# Patient Record
Sex: FEMALE | Race: OTHER | Employment: UNEMPLOYED | URBAN - METROPOLITAN AREA
[De-identification: names, ages, dates, MRNs, and addresses within clinical notes are randomized per-mention and may not be internally consistent; named-entity substitution may affect disease eponyms.]

---

## 2018-03-20 ENCOUNTER — HOSPITAL ENCOUNTER (EMERGENCY)
Age: 48
Discharge: HOME | End: 2018-03-20
Attending: EMERGENCY MEDICINE
Payer: SELF-PAY

## 2018-03-20 VITALS
RESPIRATION RATE: 16 BRPM | HEART RATE: 82 BPM | TEMPERATURE: 98 F | OXYGEN SATURATION: 97 % | SYSTOLIC BLOOD PRESSURE: 125 MMHG | DIASTOLIC BLOOD PRESSURE: 78 MMHG

## 2018-03-20 DIAGNOSIS — R09.81 NASAL CONGESTION: Primary | ICD-10-CM

## 2018-03-20 LAB — S PYO AG THROAT QL: NEGATIVE

## 2018-03-20 PROCEDURE — 87070 CULTURE OTHR SPECIMN AEROBIC: CPT | Performed by: PHYSICIAN ASSISTANT

## 2018-03-20 PROCEDURE — 74011250637 HC RX REV CODE- 250/637: Performed by: PHYSICIAN ASSISTANT

## 2018-03-20 PROCEDURE — 87880 STREP A ASSAY W/OPTIC: CPT

## 2018-03-20 PROCEDURE — 99283 EMERGENCY DEPT VISIT LOW MDM: CPT

## 2018-03-20 RX ORDER — IBUPROFEN 600 MG/1
600 TABLET ORAL
Qty: 20 TAB | Refills: 0 | Status: SHIPPED | OUTPATIENT
Start: 2018-03-20

## 2018-03-20 RX ORDER — GUAIFENESIN 600 MG/1
1200 TABLET, EXTENDED RELEASE ORAL EVERY 12 HOURS
Status: DISCONTINUED | OUTPATIENT
Start: 2018-03-20 | End: 2018-03-20 | Stop reason: HOSPADM

## 2018-03-20 RX ADMIN — GUAIFENESIN 1200 MG: 600 TABLET, EXTENDED RELEASE ORAL at 02:25

## 2018-03-20 NOTE — ED TRIAGE NOTES
Triage note: Pt arrives ambulatory with c/o nasal congestion x 1 day. +sore throat. Pt taking Afrin and Allegra with no relief.

## 2018-03-20 NOTE — ED PROVIDER NOTES
HPI Comments: 48 yo female with no significant PMH here for evaluation of congestion. States continued congestion over the past 1-2 weeks; worse today. Taking Afrin and Allegra without relief. Denies cough or facial pain/sinus pressure. Denies fever, CP, SOB, abd pain, flank pain, urinary symptoms. Patient is a 49 y.o. female presenting with nasal congestion. The history is provided by the patient and the spouse. The history is limited by a language barrier. A  was used. Nasal Congestion   This is a recurrent problem. The current episode started more than 1 week ago. The problem occurs constantly. Pertinent negatives include no chest pain, no abdominal pain, no headaches and no shortness of breath. Nothing aggravates the symptoms. Nothing relieves the symptoms. History reviewed. No pertinent past medical history. History reviewed. No pertinent surgical history. History reviewed. No pertinent family history. Social History     Social History    Marital status: N/A     Spouse name: N/A    Number of children: N/A    Years of education: N/A     Occupational History    Not on file. Social History Main Topics    Smoking status: Not on file    Smokeless tobacco: Not on file    Alcohol use Not on file    Drug use: Not on file    Sexual activity: Not on file     Other Topics Concern    Not on file     Social History Narrative    No narrative on file         ALLERGIES: Review of patient's allergies indicates no known allergies. Review of Systems   Constitutional: Negative for activity change and fever. HENT: Positive for congestion and sore throat. Negative for facial swelling, sinus pain, sinus pressure and voice change. Eyes: Negative for discharge. Respiratory: Negative for cough and shortness of breath. Cardiovascular: Negative for chest pain and leg swelling. Gastrointestinal: Negative for abdominal distention and abdominal pain.    Skin: Negative for color change. Neurological: Negative for seizures, syncope and headaches. Psychiatric/Behavioral: Negative for behavioral problems. Vitals:    03/20/18 0038   BP: 127/77   Pulse: 76   Resp: 14   Temp: 97.4 °F (36.3 °C)   SpO2: 98%            Physical Exam   Constitutional: She is oriented to person, place, and time. She appears well-developed and well-nourished. No distress. HENT:   Head: Normocephalic and atraumatic. Right Ear: External ear normal.   Left Ear: External ear normal.   Nose: Nose normal.   Mouth/Throat: Oropharynx is clear and moist. No oropharyngeal exudate. +Rhinorrhea; no sinus tenderness    Eyes: Conjunctivae and EOM are normal. Pupils are equal, round, and reactive to light. Right eye exhibits no discharge. Left eye exhibits no discharge. Neck: Normal range of motion. Neck supple. No meningeal signs    Cardiovascular: Normal rate, regular rhythm, normal heart sounds and intact distal pulses. Pulmonary/Chest: Effort normal and breath sounds normal.   Abdominal: Soft. Bowel sounds are normal. She exhibits no distension. There is no tenderness. There is no rebound and no guarding. Musculoskeletal: Normal range of motion. She exhibits no edema or tenderness. Lymphadenopathy:     She has no cervical adenopathy. Neurological: She is alert and oriented to person, place, and time. No cranial nerve deficit. Coordination normal.   Skin: Skin is warm and dry. No rash noted. Psychiatric: She has a normal mood and affect. Her behavior is normal. Judgment and thought content normal.   Nursing note and vitals reviewed. MDM  Number of Diagnoses or Management Options  Nasal congestion:      Amount and/or Complexity of Data Reviewed  Clinical lab tests: ordered and reviewed  Discuss the patient with other providers: yes          ED Course       Procedures    Patient has been reassessed. Feeling much better. Reviewed labs, medications with patient.   Ready to discharge home. Patient's results have been reviewed with them. Patient and/or family have verbally conveyed their understanding and agreement of the patient's signs, symptoms, diagnosis, treatment and prognosis and additionally agree to follow up as recommended or return to the Emergency Room should their condition change prior to follow-up. Discharge instructions have also been provided to the patient with some educational information regarding their diagnosis as well a list of reasons why they would want to return to the ER prior to their follow-up appointment should their condition change.   NGHIA Gil

## 2018-03-20 NOTE — DISCHARGE INSTRUCTIONS
Infección de las vías respiratorias altas ([place] ): Instrucciones de cuidado - [ Upper Respiratory Infection (Cold): Care Instructions ]  Instrucciones de cuidado    La infección de las vías respiratorias altas (o URI, por radha siglas en inglés), es santos infección de la [place], los senos paranasales o la garganta. Las URI se transmiten por la tos, los estornudos y el contacto directo. El resfriado común es el tipo más frecuente de URI. La gripe y las infecciones de los senos paranasales son otros tipos de URI. Carol todas las URI son causadas por virus. Los antibióticos no las Jean [place]. Sin embargo, usted puede tratar la mayoría de estas infecciones con cuidados en el hogar. [place] puede implicar beber muchos líquidos y ema analgésicos (medicamentos para el dolor) de venta everett. Es probable que se sienta mejor al cabo de 4 a 10 días. El médico lo coles revisado minuciosamente, prince se pueden presentar problemas más tarde. Si nota algún problema o nuevos síntomas, busque tratamiento médico inmediatamente. La atención de seguimiento es santos parte clave de noriega tratamiento y seguridad. Asegúrese de hacer y acudir a todas las citas, y llame a noriega médico si está teniendo problemas. También es santos buena idea saber los resultados de los exámenes y mantener santos lista de los medicamentos que carmen. ¿Cómo puede cuidarse en el [facility]? · Para prevenir la deshidratación, lucy abundantes líquidos, los suficientes tejal para que noriega orina sea de color amarillo cookie o transparente tejal el agua. Opte por beber agua y otros líquidos angela sin cafeína hasta que se sienta mejor. Si tiene [facility] [employer], del corazón o del hígado y tiene que Orondo's líquidos, hable con noriega médico antes de aumentar noriega consumo. · [place] un analgésico de venta everett, tejal acetaminofén (Tylenol), ibuprofeno (Advil, Motrin) o naproxeno (Aleve). Lillie y siga todas las instrucciones de la [place].   · Antes de usar medicamentos para la tos y los resfriados, revise la etiqueta. Estos medicamentos podrían no ser seguros para los niños pequeños o las personas con ciertos problemas de [place]. · Tenga cuidado cuando tome medicamentos de venta everett para el resfriado común o la gripe y Tylenol al [employer]. Muchos de estos medicamentos contienen acetaminofén, o sea, Tylenol. Lillie las etiquetas para asegurarse de que no está tomando santos dosis mayor que la recomendada. El exceso de acetaminofén (Tylenol) puede ser dañino. · Descanse lo suficiente. · No fume ni permita que otros fumen cerca de usted. Si necesita ayuda para dejar de fumar, hable con onriega médico acerca de programas y medicamentos para dejar de fumar. Estos pueden aumentar radha probabilidades de dejar el hábito para siempre. ¿Cuándo debe pedir ayuda? Llame al 911 en cualquier momento que considere que necesita atención de [place]. Por ejemplo, llame si:  ? · Tiene graves dificultades para respirar. ? Llame a noriega médico ahora mismo o busque atención médica inmediata si:  ? · Le parece que está mucho más enfermo. ? · Tiene nueva o peor dificultad para respirar. ? · Tiene fiebre nueva o más alessandro. ? · Tiene un salpullido nuevo. ?Preste especial atención a los cambios en noriega graham y asegúrese de comunicarse con noriega médico si:  ? · Tiene síntomas nuevos, tejal dolor de garganta, dolor de oídos o dolor de los senos paranasales. ? · Noriega tos es más profunda o más frecuente que antes, especialmente si nota más mucosidad o un cambio en el color de la mucosidad. ? · No mejora tejal se esperaba. ¿Dónde puede encontrar más información en inglés? Jannet Creed a http://manasa-eugenia.info/. Jose Manuel Wilburn N346 en la búsqueda para aprender más acerca de \"Infección de las vías respiratorias altas ([place]): Instrucciones de cuidado - [ Upper Respiratory Infection (Cold): Care Instructions ]. \"  Revisado: 12 [place], 2017  Versión del contenido: 11.4  © 9977-1571 Healthwise, Med[employer].  Robert Hwang instrucciones de cuidado fueron adaptadas bajo licencia por Good [facility] (which disclaims liability or warranty for this information). Si usted tiene [facility] afección médica o sobre estas instrucciones, siempre pregunte a noriega profesional de graham. [facility] niega toda garantía o responsabilidad por noriega uso de esta información.

## 2018-03-20 NOTE — ED NOTES
PA reviewed discharge instructions and options with patient and patient verbalized understanding. RN reviewed discharge instructions using teachback method. Pt ambulated to exit without difficulty and in no signs of acute distress escorted by  who will drive home. No complaints or needs expressed at this time. Patient was counseled on medications prescribed at discharge. VSS at time of discharge. Pt to call PCP in the morning for follow up.

## 2018-03-22 LAB
BACTERIA SPEC CULT: NORMAL
SERVICE CMNT-IMP: NORMAL

## 2018-04-20 ENCOUNTER — OFFICE VISIT (OUTPATIENT)
Dept: FAMILY MEDICINE CLINIC | Age: 48
End: 2018-04-20

## 2018-04-20 VITALS
HEIGHT: 63 IN | HEART RATE: 63 BPM | TEMPERATURE: 97.6 F | SYSTOLIC BLOOD PRESSURE: 120 MMHG | WEIGHT: 155 LBS | DIASTOLIC BLOOD PRESSURE: 72 MMHG | BODY MASS INDEX: 27.46 KG/M2

## 2018-04-20 DIAGNOSIS — J30.1 SEASONAL ALLERGIC RHINITIS DUE TO POLLEN: ICD-10-CM

## 2018-04-20 DIAGNOSIS — Z13.9 ENCOUNTER FOR SCREENING: Primary | ICD-10-CM

## 2018-04-20 LAB
S PYO AG THROAT QL: NEGATIVE
VALID INTERNAL CONTROL?: YES

## 2018-04-20 RX ORDER — FLUTICASONE PROPIONATE 50 MCG
SPRAY, SUSPENSION (ML) NASAL
Qty: 1 BOTTLE | Refills: 5 | Status: SHIPPED | OUTPATIENT
Start: 2018-04-20 | End: 2018-06-01 | Stop reason: SDUPTHER

## 2018-04-20 RX ORDER — PSEUDOEPHEDRINE HCL 30 MG
30 TABLET ORAL
Qty: 20 TAB | Refills: 2 | Status: SHIPPED | OUTPATIENT
Start: 2018-04-20 | End: 2018-06-01

## 2018-04-20 RX ORDER — LORATADINE 10 MG/1
10 TABLET ORAL DAILY
Qty: 30 TAB | Refills: 5 | Status: SHIPPED | OUTPATIENT
Start: 2018-04-20 | End: 2018-06-01

## 2018-04-20 RX ORDER — IBUPROFEN 600 MG/1
600 TABLET ORAL
Qty: 60 TAB | Refills: 1 | Status: SHIPPED | OUTPATIENT
Start: 2018-04-20 | End: 2018-06-01 | Stop reason: SDUPTHER

## 2018-04-20 NOTE — PATIENT INSTRUCTIONS
Alergias: Instrucciones de cuidado - [ Allergies: Care Instructions ]  Instrucciones de cuidado    Las alergias ocurren cuando el sistema de defensa del organismo (sistema inmunitario) reacciona de manera excesiva ante ciertas sustancias. El sistema inmunitario trata a santos sustancia inofensiva tejal si fuera un microbio perjudicial o un virus. Existen muchas sustancias que pueden provocar esta reacción excesiva, incluidos el polen, los medicamentos, los alimentos, el polvo, la caspa de los [place] y el moho. Las [employer] pueden ser leves o graves. Las alergias leves pueden manejarse en el hogar. Sin embargo, es posible que necesite ema medicamentos para prevenir problemas. Manejar las alergias es santos parte importante del estar saludable. Noriega médico podría sugerirle que se dariusz santos prueba de [place] para ayudar a encontrar la causa de las [employer]. Oralee Mohs que sepa cuáles son las cosas que [place] síntomas, puede evitarlas. [place] puede prevenir los síntomas de [place] y [de-identified] de [facility]. Para las [employer] graves que provocan reacciones que afectan a todo noriega organismo (reacciones anafilácticas), noriega médico podría recetarle santos inyección de epinefrina para que la lleve con usted en breanne de [place] reacción grave. Aprenda a aplicarse la dosis usted mismo y llévela consigo todo el [place]. Asegúrese de que no haya caducado. La atención de seguimiento es santos parte clave de noriega tratamiento y seguridad. Asegúrese de hacer y acudir a todas las citas, y llame a noriega médico si está teniendo problemas. También es santos buena idea saber los resultados de los exámenes y mantener santos lista de los medicamentos que carmen. ¿Cómo puede cuidarse en el hogar? · Si noriega médico le ha [employer] ácaros del polvo o el polvo es lo que causa noriega alergia, reduzca la cantidad de polvo alrededor de noriega cama:  ¨ Lave las sábanas, las fundas de las almohadas y demás ropa de cama con [place] todas las semanas.   Shani Woodwardant fundas para almohadas, edredones y colchones a prueba de polvo. Evite las fundas de plástico, ya que tienden a romperse fácilmente y no \"respiran\". Lave la ropa de cama siguiendo las instrucciones de la etiqueta. ¨ No use mantas ni almohadas que no necesite. ¨ Use mantas que pueda chelsi en la lavadora. [address] ashley y las alfombras de noriega habitación, ya que atraen y acumulan polvo. · Si usted es alérgico al polvo del hogar y a los [place], no use humidificadores. El médico puede sugerirle maneras de controlar el polvo y [place]. · Busque rastros de cucarachas. Las cucarachas provocan reacciones alérgicas. Use cebos para cucarachas para eliminarlas. Luego, limpie can noriega casa. A las cucarachas les [employer] lugares donde se almacenan bolsas del ortez, periódicos, botellas vacías o becky de cartón. No guarde estos objetos dentro de la casa, y mantenga el contenedor de basura y los recipientes de alimentos can cerrados. Selle todos los lugares por donde las cucarachas puedan ingresar a noriega hogar. · Si usted es alérgico al moho, deshágase de muebles, tapetes y ashley que huelan a moho. Revise que no haya moho en el baño. · Si usted es alérgico al polen del exterior o a las esporas de moho, use el aire acondicionado. Cambie o limpie todos los filtros santos vez al [facility]. [facility]. · Si usted es alérgico al polen, no salga cuando la concentración de polen sea alessandro. Use santos aspiradora con filtro HEPA o filtro de doble espesor al [employer] a la [place]. · No salga cuando la contaminación del aire sea alessandro. Evite los vapores de la pintura, los perfumes y otros olores moises. · Evite todo lo que pueda empeorar radha alergias. Manténgase alejado del humo. No fume ni deje que otras personas lo chetna en noriega casa. No use chimeneas ni estufas de leña. · Si usted es alérgico a radha mascotas, cambie el filtro de aire de la calefacción todos los [place].  Use filtros de alto rendimiento. · Si usted es alérgico a la caspa de los [place], no deje que las mascotas entren a la casa o manténgalas fuera de noriega habitación. Las alfombras [place] y los muebles tapizados con smita pueden albergar gran cantidad de caspa de animales. Deo vez tenga que reemplazarlos. ¿Cuándo debe pedir ayuda? Aplíquese santos inyección de epinefrina si:  ? · Piensa que está teniendo [facility] reacción alérgica grave. ? · Tiene síntomas en más de santos marianela del cuerpo, tejal náuseas leves y comezón en la boca. ? Después de aplicarse santos inyección de epinefrina, llame al 911 incluso si se siente mejor. ?Llame al 911 si:  ? · Tiene síntomas de santos reacción alérgica grave. Estos pueden incluir:  ¨ Zonas abultadas y enrojecidas (ronchas) que aparecen repentinamente por todo el cuerpo. ¨ Hinchazón de la garganta, la boca, los labios o la [place]. ¨ Dificultad para respirar. ¨ Pérdida del conocimiento (desmayo). O podría sentirse muy aturdido o de repente sentirse débil, confuso o agitado. ? · Le montgomery aplicado santos inyección de epinefrina, incluso si se siente mejor. ?Llame a noriega médico ahora mismo o busque atención médica inmediata si:  ? · Tiene síntomas de santos reacción alérgica, tales tejal:  ¨ Salpullido o ronchas (zonas abultadas y enrojecidas en la piel). ¨ Comezón. ¨ Hinchazón. ¨ Dolor abdominal, náuseas o vómito. ?Preste especial atención a los cambios en noriega graham y asegúrese de comunicarse con noriega médico si:  ? · No mejora tejal se esperaba. ¿Dónde puede encontrar más información en inglés? Vanessa End a http://manasa-eugenia.info/. [address] [place] [address] en la búsqueda para aprender más acerca de \"Alergias: Instrucciones de cuidado - [ Allergies: Care Instructions ]. \"  Revisado: 29 septiembre, 2016  Versión del contenido: 11.4  © 7465-9435 Healthwise, Wikets. Las instrucciones de cuidado fueron adaptadas bajo licencia por Good [employer] (which disclaims liability or warranty for this information). Si usted tiene [place] afección médica o sobre estas instrucciones, siempre pregunte a noriega profesional de graham. [facility] niega toda garantía o responsabilidad por noriega uso de esta información.

## 2018-04-20 NOTE — PROGRESS NOTES
Printed AVS, provided to pt and reviewed. Pt indicated understanding and had no questions. Told pt that rx's have been sent to pharmacy and they should be ready for  in approximately 2 hrs. Pt told to please present GoodRx. com coupon which we provided to your pharmacy to receive discounted price. The medication's ordered today were reviewed with the pt. Yanna Eldridge was the .  Jaylin Elizabeth RN

## 2018-04-20 NOTE — PROGRESS NOTES
Results for orders placed or performed in visit on 04/20/18   AMB POC RAPID STREP A   Result Value Ref Range    VALID INTERNAL CONTROL POC Yes     Group A Strep Ag Negative Negative

## 2018-04-20 NOTE — PROGRESS NOTES
Assessment/Plan:    Diagnoses and all orders for this visit:    1. Encounter for screening  -     AMB POC RAPID STREP A    2. Seasonal allergic rhinitis due to pollen  -     fluticasone (FLONASE ALLERGY RELIEF) 50 mcg/actuation nasal spray; Si spray each nostril bid. uso 1 gota cada nares dos veces al jannie  -     pseudoephedrine (SUDAFED) 30 mg tablet; Take 1 Tab by mouth every four (4) hours as needed for Congestion. Erick 1 pastilla cada 4 horas si necissita  -     loratadine (CLARITIN) 10 mg tablet; Take 1 Tab by mouth daily. Erick 1 pastilla cada jannie  -     ibuprofen (MOTRIN) 600 mg tablet; Take 1 Tab by mouth every six (6) hours as needed for Pain. Erick 1 pastilla cada 6 horas si necissita        Follow-up Disposition:  Return if symptoms worsen or fail to improve. ANAM Virgen expressed understanding of this plan. An AVS was printed and given to the patient.      ----------------------------------------------------------------------    Chief Complaint   Patient presents with    Nasal Congestion     x5 months. sore throat. History of Present Illness:  Pt is here for 24 + hours of nasal congestion  She went to the ER a few days ago for similar sxs and was given mucinex which has not helped her sxs  She denies the following: itchy eyes, cough, sore throat or fever  She denies risk of pregnancy        No past medical history on file. Current Outpatient Prescriptions   Medication Sig Dispense Refill    fluticasone (FLONASE ALLERGY RELIEF) 50 mcg/actuation nasal spray Si spray each nostril bid. uso 1 gota cada nares dos veces al jannie 1 Bottle 5    pseudoephedrine (SUDAFED) 30 mg tablet Take 1 Tab by mouth every four (4) hours as needed for Congestion. Erick 1 pastilla cada 4 horas si necissita 20 Tab 2    loratadine (CLARITIN) 10 mg tablet Take 1 Tab by mouth daily.  Erick 1 pastilla cada jannie 30 Tab 5    ibuprofen (MOTRIN) 600 mg tablet Take 1 Tab by mouth every six (6) hours as needed for Pain. Higgins 1 pastilla cada 6 horas si necissita 60 Tab 1       Allergies no known allergies    Social History   Substance Use Topics    Smoking status: Never Smoker    Smokeless tobacco: Never Used    Alcohol use No       No family history on file.     Physical Exam:     Visit Vitals    /72 (BP 1 Location: Right arm)    Pulse 63    Temp 97.6 °F (36.4 °C) (Oral)    Ht 5' 3.39\" (1.61 m)    Wt 155 lb (70.3 kg)    LMP 03/25/2018    BMI 27.12 kg/m2     gen looks well, pleasant  A&Ox3  WDWN NAD  Respirations normal and non labored  TM's bulging clear fluid  Nares boggy turbinates raul with clear mucoid discharge  OP red posteriorly  Neck supple  Lungs CTA raul

## 2018-04-20 NOTE — MR AVS SNAPSHOT
[address] [place] [address] [address] [place] [address] 
[phone] Patient: Niya Kraft MRN: [id number] Visit Information Verónica y [place] Personal Médico Departamento Teléfono del Dep. Número de visita 2018  8:30 AM [facility] [phone] Follow-up Instructions Return if symptoms worsen or fail to improve. Upcoming Health Maintenance Date Due DTaP/Tdap/Td series (1 - Tdap) 1990 PAP AKA CERVICAL CYTOLOGY 1990 Influenza Age 5 to Adult 2017 Jessica Lopez A partir del:  2018 No Known Allergies Vacunas actuales Agarwal Pel No hay ninguna vacuna archivada. No revisadas esta visita You Were Diagnosed With   
  
 Joy Henao Encounter for screening    -  Primary ICD-10-CM: Z13.9 ICD-9-CM: V82.9 Seasonal allergic rhinitis due to pollen     ICD-10-CM: J30.1 ICD-9-CM: 477.0 Partes vitales PS Pulso Temperatura [place] ( percentil de crecimiento) Peso (percentil de crecimiento) LMP (última tyler) 120/72 (BP 1 Location: Right arm) 63 97.6 °F (36.4 °C) (Oral) 5' 3.39\" (1.61 m) 155 lb (70.3 kg) 2018 BMI [facility]) Estado obstétrico Estatus de tabaquísmo 27.12 kg/m2 Having regular periods Never Smoker Historial de signos vitales BMI and BSA Data Body Mass Index Body Surface Area  
 27.12 kg/m 2 1.77 m 2 [facility] Name Phone Laura Bloch [address] [facility], [address] [place] [address] Rd. [phone] Mares lista de medicamentos actualizada Mercy Borjas actualizada 18  9:32 AM.  Marianne Rogers use mares lista de medicamentos más reciente. fluticasone 50 mcg/actuation nasal spray También conocido tejal:  FLONASE ALLERGY RELIEF  
 Si spray each nostril bid. uso 1 gota cada nares dos veces al jannie  
  
 ibuprofen 600 mg tablet También conocido tejal:  MOTRIN Take 1 Tab by mouth every six (6) hours as needed for Pain. [place] 1 pastilla cada 6 horas si necissita  
  
 loratadine 10 mg tablet También conocido tejal:  Ronold Mat[place]amkeag Take 1 Tab by mouth daily. [place] 1 pastilla cada jannie  
  
 pseudoephedrine 30 mg tablet También conocido tejal:  SUDAFED Take 1 Tab by mouth every four (4) hours as needed for Congestion. [place] 1 pastilla cada 4 horas si necissita Recetas Enviado a la [place] Refills  
 fluticasone (FLONASE ALLERGY RELIEF) 50 mcg/actuation nasal spray 5 Sig: Si spray each nostril bid. uso 1 gota cada nares dos veces al jannie Class: Normal  
 Pharmacy: [facility] DR ISABEL SCHULER [address] [place] [address],1 Ph #: [phone]  
 pseudoephedrine (SUDAFED) 30 mg tablet 2 Sig: Take 1 Tab by mouth every four (4) hours as needed for Congestion. [place] 1 pastilla cada 4 horas si necissita Class: Normal  
 Pharmacy: [facility] DR ISABEL SCHULER [address] Ph #: [phone] Route: Oral  
 loratadine (CLARITIN) 10 mg tablet 5 Sig: Take 1 Tab by mouth daily. [place] 1 pastilla cada jannie Class: Normal  
 Pharmacy: [facility] DR ISABEL SCHULER [address] Ph #: [phone] Route: Oral  
 ibuprofen (MOTRIN) 600 mg tablet 1 Sig: Take 1 Tab by mouth every six (6) hours as needed for Pain. [place] 1 pastilla cada 6 horas si necissita Class: Normal  
 Pharmacy: [facility] DR ISABEL FELICIANONADIA [address] Ph #: [phone] Route: Oral  
  
Hicimos lo siguiente AMB POC RAPID STREP A [27655 CPT(R)] Instrucciones de seguimiento Return if symptoms worsen or fail to improve. Instrucciones para el Paciente Alergias: Instrucciones de cuidado - [ Allergies: Care Instructions ] Instrucciones de cuidado Las alergias ocurren cuando el sistema de defensa del organismo (sistema inmunitario) reacciona de manera excesiva ante ciertas sustancias. El sistema inmunitario trata a santos sustancia inofensiva tejal si fuera un microbio perjudicial o un virus. Existen muchas sustancias que pueden provocar esta reacción excesiva, incluidos el polen, los medicamentos, los alimentos, el polvo, la caspa de los [place] y el moho. Las [address] pueden ser leves o graves. Las alergias leves pueden manejarse en el hogar. Sin embargo, es posible que necesite ema medicamentos para prevenir problemas. Manejar las alergias es santos parte importante del estar saludable. Noriega médico podría sugerirle que se dariusz santos prueba de [place] para ayudar a encontrar la causa de las [address]. [place] que sepa cuáles son las cosas que [place] síntomas, puede evitarlas. [place] puede prevenir los síntomas de [place] y [de-identified] de [facility]. Para las [address] graves que provocan reacciones que afectan a todo noriega organismo (reacciones anafilácticas), noriega médico podría recetarle santos inyección de epinefrina para que la lleve con usted en breanne Leonardo Bue reacción grave. Aprenda a aplicarse la dosis usted mismo y llévela consigo todo el Cel[place]. Asegúrese de que no haya caducado. La atención de seguimiento es santos parte clave de noriega tratamiento y seguridad. Asegúrese de hacer y acudir a todas las citas, y llame a noriega médico si está teniendo problemas. También es santos buena idea saber los resultados de los exámenes y mantener santos lista de los medicamentos que carmen. Cómo puede cuidarse en el hogar? · Si noriega médico le ha [employer] ácaros del polvo o el polvo es lo que causa noriega alergia, reduzca la cantidad de polvo alrededor de noriega cama: 
¨ Lave las sábanas, las fundas de las almohadas y demás ropa de cama con [place] todas las semanas. ¨ Utilice fundas para almohadas, edredones y colchones a prueba de polvo. Evite las fundas de plástico, ya que tienden a romperse fácilmente y no \"respiran\". Lave la ropa de cama siguiendo las instrucciones de la etiqueta. ¨ No use mantas ni almohadas que no necesite. ¨ Use mantas que pueda chelsi en la lavadora. [address] ashley y las alfombras de noriega habitación, ya que atraen y acumulan polvo. · Si usted es alérgico al polvo del hogar y a los [place], no use humidificadores. El médico puede sugerirle maneras de controlar el polvo y [place]. · Busque rastros de cucarachas. Las cucarachas provocan reacciones alérgicas. Use cebos para cucarachas para eliminarlas. Luego, limpie can noriega casa. A las cucarachas les [employer] lugares donde se almacenan bolsas del ortez, periódicos, botellas vacías o becky de cartón. No guarde estos objetos dentro de la casa, y mantenga el contenedor de basura y los recipientes de alimentos can cerrados. Selle todos los lugares por donde las cucarachas puedan ingresar a noriega hogar. · Si usted es alérgico al moho, deshágase de muebles, tapetes y ashley que huelan a moho. Revise que no haya moho en el baño. · Si usted es alérgico al polen del exterior o a las esporas de moho, use el aire acondicionado. Cambie o limpie todos los filtros santos vez al [facility]. [facility]. · Si usted es alérgico al polen, no salga cuando la concentración de polen sea alessandro. Use santos aspiradora con filtro HEPA o filtro de doble espesor al [employer] a la [place]. · No salga cuando la contaminación del aire sea alessandro. Evite los vapores de la pintura, los perfumes y otros olores moises. · Evite todo lo que pueda empeorar radha alergias. Manténgase alejado del humo. No fume ni deje que otras personas lo chetna en noriega casa. No use chimeneas ni estufas de leña. · Si usted es alérgico a radha mascotas, cambie el filtro de aire de la calefacción todos los [place]. Use filtros de alto rendimiento. · Si usted es alérgico a la caspa de los [place], no deje que las mascotas entren a la casa o manténgalas fuera de noriega habitación. Las alfombras [place] y los muebles tapizados con smita pueden albergar gran cantidad de caspa de animales. Deo vez tenga que reemplazarlos. Cuándo debe pedir ayuda? Aplíquese santos inyección de epinefrina si: 
? · Piensa que está teniendo [facility] reacción alérgica grave. ? · Tiene síntomas en más de santos marianela del cuerpo, tejal náuseas leves y comezón en la boca. ? Después de aplicarse santos inyección de epinefrina, llame al 911 incluso si se siente mejor. ?Llame al 911 si: 
? · Tiene síntomas de santos reacción alérgica grave. Estos pueden incluir: 
¨ Zonas abultadas y enrojecidas (ronchas) que aparecen repentinamente por todo el cuerpo. ¨ Hinchazón de la garganta, la boca, los labios o la [place]. ¨ Dificultad para respirar. ¨ Pérdida del conocimiento (desmayo). O podría sentirse muy aturdido o de repente sentirse débil, confuso o agitado. ? · Le montgomery aplicado santos inyección de epinefrina, incluso si se siente mejor. ?Llame a noriega médico ahora mismo o busque atención médica inmediata si: 
? · Tiene síntomas de santos reacción alérgica, tales tejal: 
¨ Salpullido o ronchas (zonas abultadas y enrojecidas en la piel). ¨ Comezón. ¨ Hinchazón. ¨ Dolor abdominal, náuseas o vómito. ?Preste especial atención a los cambios en noriega graham y asegúrese de comunicarse con noriega médico si: 
? · No mejora tejal se esperaba. Dónde puede encontrar más información en inglés? Sheryl Blevins a [email or web address] Patricia MACIAS en la búsqueda para aprender más acerca de \"Alergias: Instrucciones de cuidado - [ Allergies: Care Instructions ]. \" 
Revisado: 29 septiembre, 2016 Versión del contenido: 11.4 © 4413-6243 Healthwise, [employer] Coding.  Las instrucciones de cuidado fueron adaptadas bajo licencia por [employer] (which disclaims liability or warranty for this information). Si usted tiene [place] afección médica o sobre estas instrucciones, siempre pregunte a noriega profesional de graham. [facility] niega toda garantía o responsabilidad por noriega uso de esta información. Introducing [facility] SERVICES! [facility] introduce portal paciente MyChart . Ahora se puede acceder a partes de noriega expediente médico, enviar por correo electrónico la oficina de noriega médico y solicitar renovaciones de medicamentos en línea. En noriega navegador de Internet , Roshni Crejoyce a [email or web address]. My Perfect Gig/mychart Dariusz clic en el usuario por Frannie Lone? [place]ie[place] diop aquí en la sesión Raul Handy. Verá la página de registro [place]. Ingrese noriega código de [facility] evaristo y tejal aparece a continuación. Usted no tendrá que [employer] código después de marie completado el proceso de registro . Si usted no se inscribe antes de la fecha de caducidad , debe solicitar un nuevo código. · MyChart Rupa Fess : [id number] Expires: 7/19/2018  9:32 AM 
 
Ingresa los últimos cuatro dígitos de noriega Número de Seguro Social ( xxxx ) y fecha de nacimiento ( dd / mm / aaaa ) tejal se indica y dariusz clic en Enviar. [place] será llevado a la siguiente página de registro . Crear un ID MyChart . Esta será noriega ID de inicio de sesión de MyChart y no puede ser [place] , por lo que pensar en santos que es [place] y fácil de recordar . Crear santos contraseña MyChart . [place] puede cambiar noriega contraseña en cualquier momento . Ingrese noriega Password Reset de preguntas y [place] . [place] se puede utilizar en un momento posterior si usted olvida noriega contraseña. Introduzca noriega dirección de correo electrónico . Marielos Jerry recibirá santos notificación por correo electrónico cuando la nueva información está disponible en MyChart . Jose Eduardo diop en Registrarse.  Byron Levine megan y descargar porciones de noriega expediente médico. 
 Crow chikis en el enlace de descarga del menú Resumen para descargar santos copia portátil de noriega información médica . Si tiene [employer] , por favor visite la sección de preguntas frecuentes del sitio web MyChart . Recuerde, MyChart NO es que se utilizará para las necesidades urgentes. Para emergencias médicas , llame al 911 . Ahora disponible en noriega iPhone y Android ! Por favor proporcione shital resumen de la documentación de cuidado a noriega próximo proveedor. If you have any questions after today's visit, please call [phone].

## 2018-06-01 ENCOUNTER — OFFICE VISIT (OUTPATIENT)
Dept: FAMILY MEDICINE CLINIC | Age: 48
End: 2018-06-01

## 2018-06-01 VITALS
SYSTOLIC BLOOD PRESSURE: 121 MMHG | WEIGHT: 157 LBS | HEIGHT: 64 IN | BODY MASS INDEX: 26.8 KG/M2 | HEART RATE: 53 BPM | DIASTOLIC BLOOD PRESSURE: 72 MMHG | TEMPERATURE: 97.9 F | OXYGEN SATURATION: 100 %

## 2018-06-01 DIAGNOSIS — J30.1 SEASONAL ALLERGIC RHINITIS DUE TO POLLEN: ICD-10-CM

## 2018-06-01 DIAGNOSIS — M54.31 SCIATICA OF RIGHT SIDE: Primary | ICD-10-CM

## 2018-06-01 RX ORDER — PREDNISONE 10 MG/1
TABLET ORAL
Qty: 21 TAB | Refills: 0 | Status: SHIPPED | OUTPATIENT
Start: 2018-06-01 | End: 2018-10-19

## 2018-06-01 RX ORDER — IBUPROFEN 600 MG/1
600 TABLET ORAL
Qty: 60 TAB | Refills: 1 | Status: SHIPPED | OUTPATIENT
Start: 2018-06-01

## 2018-06-01 RX ORDER — FLUTICASONE PROPIONATE 50 MCG
SPRAY, SUSPENSION (ML) NASAL
Qty: 1 BOTTLE | Refills: 5 | Status: SHIPPED | OUTPATIENT
Start: 2018-06-01

## 2018-06-01 RX ORDER — CETIRIZINE HYDROCHLORIDE, PSEUDOEPHEDRINE HYDROCHLORIDE 5; 120 MG/1; MG/1
1 TABLET, FILM COATED, EXTENDED RELEASE ORAL 2 TIMES DAILY
Qty: 60 TAB | Refills: 2 | Status: SHIPPED | OUTPATIENT
Start: 2018-06-01

## 2018-06-01 NOTE — PROGRESS NOTES
Statements below were documented by Aissatou Fitch discharged home with AVS and Medication teaching . No further questions. Reviewed patient's medications, how to take, where to pickup and if any refills, patient verbalized understanding and has no questions. Good rx coupon given for Flonase $ 25.01 @ [employer] , no coupon necessary for Zyrtec $ 9.73. [facility] brochure given and explained in [address] [facility]  Jose Pederson RN

## 2018-06-01 NOTE — PROGRESS NOTES
Coordination of Care  1. Have you been to the ER, urgent care clinic since your last visit? Hospitalized since your last visit? No    2. Have you seen or consulted any other health care providers outside of the [address] [place] [address] since your last visit? Include any pap smears or colon screening. No    Does the patient need refills? YES    Learning Assessment Complete?  yes

## 2018-06-01 NOTE — MR AVS SNAPSHOT
Gabrielle Heck 
 
 
 [address] [place] [address] [address] [place] [address] 
[phone] Patient: Shellie Hough MRN: [id number] :1970 Visit Information Evan Calderon y [place] Personal Médico Departamento Teléfono del Dep. Número de visita 2018  8:30 AM Ayo Nava 89, PA [facility] [phone] [id number] Follow-up Instructions Return in about 6 weeks (around 2018), or if symptoms worsen or fail to improve. Upcoming Health Maintenance Date Due DTaP/Tdap/Td series (1 - Tdap) 1990 PAP AKA CERVICAL CYTOLOGY 1990 Influenza Age 5 to Adult 2018 Alergias  Review Complete El: 2018 Por: Jam Montiel A partir del:  2018 No Known Allergies Vacunas actuales Bulmaro Kelley No hay ninguna vacuna archivada. No revisadas esta visita You Were Diagnosed With   
  
 Deann Orn Sciatica of right side    -  Primary ICD-10-CM: M54.31 
ICD-9-CM: 724.3 Seasonal allergic rhinitis due to pollen     ICD-10-CM: J30.1 ICD-9-CM: 477.0 Partes vitales PS Pulso Temperatura [place] ( percentil de crecimiento) Peso (percentil de crecimiento) LMP (última tyler) 121/72 (BP 1 Location: Right arm, BP Patient Position: Sitting) (!) 53 97.9 °F (36.6 °C) (Oral) 5' 3.75\" (1.619 m) 157 lb (71.2 kg) 2018 SpO2 BMI (IMC) Estado obstétrico Estatus de tabaquísmo 100% 27.16 kg/m2 Having regular periods Never Smoker Historial de signos vitales BMI and BSA Data Body Mass Index Body Surface Area  
 27.16 kg/m 2 1.79 m 2 [facility] Name Phone [address] [place] [address], [address] [place] [address] Rd. [phone] Mares lista de medicamentos actualizada Caralyn Larch actualizada 18  9:32 AM.  Stephanie Ellington use mares lista de medicamentos más reciente. cetirizine-psuedoePHEDrine 5-120 mg per tablet También conocido tejal:  ZyrTEC-D Take 1 Tab by mouth two (2) times a day. fluticasone 50 mcg/actuation nasal spray También conocido tejal:  FLONASE ALLERGY RELIEF Si spray each nostril bid. uso 1 gota cada nares dos veces al jannie  
  
 ibuprofen 600 mg tablet También conocido tejal:  MOTRIN Take 1 Tab by mouth every six (6) hours as needed for Pain. [place] 1 pastilla cada 6 horas si necissita  
  
 predniSONE 10 mg tablet También conocido tejal:  Gina Look Si today, then 1 less pill daily until all pills are gone. English sig please Recetas Enviado a la [place] Refills  
 cetirizine-psuedoePHEDrine (ZYRTEC-D) 5-120 mg per tablet 2 Sig: Take 1 Tab by mouth two (2) times a day. Class: Normal  
 Pharmacy: [address] [place] [address] Ph #: [phone] Route: Oral  
 predniSONE (DELTASONE) 10 mg tablet 0 Sig: Si today, then 1 less pill daily until all pills are gone. English sig please Class: Normal  
 Pharmacy: [address] [place] [address],[facility] Ph #: [phone]  
 ibuprofen (MOTRIN) 600 mg tablet 1 Sig: Take 1 Tab by mouth every six (6) hours as needed for Pain. [place] 1 pastilla cada 6 horas si necissita Class: Normal  
 Pharmacy: [address] [place] [address] Ph #: [phone] Route: Oral  
 fluticasone (FLONASE ALLERGY RELIEF) 50 mcg/actuation nasal spray 5 Sig: Si spray each nostril bid. uso 1 gota cada nares dos veces al jannie Class: Normal  
 Pharmacy: [address] [place] [address] Ph #: [phone] Instrucciones de seguimiento Return in about 6 weeks (around 2018), or if symptoms worsen or fail to improve. Instrucciones para el Paciente Ciática: Instrucciones de cuidado - [ Sciatica: Care Instructions ] Instrucciones de cuidado La ciática es santos irritación de sailaja de los nervios ciáticos, que salen de la médula serra en la parte baja de la espalda. Los nervios ciáticos y radha ramificaciones se extienden hacia abajo por la nalga hasta el pie. La ciática puede desarrollarse cuando un disco lesionado en la espalda ejerce presión contra la raíz de un nervio serra. Mares síntoma principal es dolor, entumecimiento o debilidad que con frecuencia es peor en la pierna o el pie que en la espalda. A menudo la ciática mejora y desaparece con el paso del Celia. Un tratamiento temprano generalmente incluye medicamentos y ejercicios para aliviar el dolor. La atención de seguimiento es santos parte clave de mares tratamiento y seguridad. Asegúrese de hacer y acudir a todas las citas, y llame a mares médico si está teniendo problemas. También es santos buena idea saber los resultados de los exámenes y mantener santos lista de los medicamentos que carmen. Cómo puede cuidarse en el hogar? · [employer] analgésicos (medicamentos para el dolor) exactamente tejal le fueron indicados. ¨ Si el médico le recetó un analgésico, tómelo según las indicaciones. ¨ Si no está tomando un analgésico recetado, pregúntele a mares médico si puede ema sailaja de [employer]. · Utilice calor o frío para aliviar el dolor. ¨ Para aplicar calor, póngase santos bolsa de agua tibia, santos almohadilla térmica a baja temperatura o santos compresa tibia sobre la espalda. No se vaya a dormir con santos almohadilla térmica sobre la piel. ¨ Para utilizar frío, póngase hielo o santos compresa fría sobre la marianela tiffanie un período de 10 a 20 minutos cada vez. Póngase un paño levy entre el hielo y la piel. · Evite sentarse si es posible, a menos que se sienta mejor que de pie. · Alterne entre recostarse y narendra caminatas cortas. Aumente la distancia que camina tanto tejal pueda sin empeorar los síntomas. · No dariusz nada que empeore los síntomas. Cuándo debe pedir ayuda? Llame al 911 en cualquier momento que considere que necesita atención de [place]. Por ejemplo, llame si: 
? · Es completamente incapaz de  santos pierna. ?Llame a mares médico ahora mismo o busque atención médica inmediata si: 
? · Tiene síntomas nuevos o peores en [address] (glúteos). Los síntomas pueden incluir: 
¨ Entumecimiento u hormigueo. ¨ Debilidad. ¨ Dolor. ? · Pierde el control de la vejiga o del intestino. ? Vigile de cerca los cambios en mares graham y asegúrese de comunicarse con mares médico si: 
? · No mejora tejal se esperaba. Dónde puede encontrar más información en inglés? Khalida hurtado [email or web address]-[email or web address].[email or web address] Joe Cowart W656 en la búsqueda para aprender más acerca de \"Ciática: Instrucciones de cuidado - [ Sciatica: Care Instructions ]. \" 
Revisado: 21 marzo, 2017 Versión del contenido: 11.4 © 5448-2603 Healthwise, Incorporated. Las instrucciones de cuidado fueron adaptadas bajo licencia por [employer] Help Connections (which disclaims liability or warranty for this information). Si usted tiene [place] afección médica o sobre estas instrucciones, siempre pregunte a mares profesional de graham. Healthwise, Incorporated niega toda garantía o responsabilidad por mares uso de esta información. Ciática: Ejercicios - [ Sciatica: Exercises ] Instrucciones de cuidado Éstos son algunos ejemplos de ejercicios típicos de rehabilitación para mares afección. Comience cada ejercicio lentamente. Reduzca la intensidad del ejercicio si [employer] a sentir dolor. Mares médico o el fisioterapeuta le dirán cuándo puede comenzar con estos ejercicios y cuáles funcionarán mejor para usted. Cuando no esté activo, encuentre santos posición cómoda para descansar. Algunas personas se sienten cómodas en el piso o en santos cama de firmeza media con santos almohada pequeña debajo de la naveen y otra debajo de radha rodillas.  Otras personas prefieren acostarse de lado con [place] entre las rodillas. No permanezca en santos posición por [employer]. Dé paseos cortos (10 a 20 minutos) cada 2 a 3 horas. Evite las pendientes, las colinas y las escaleras hasta que se sienta mejor. Sólo camine distancias que pueda manejar sin dolor, especialmente dolor en las piernas. Cómo se hacen los ejercicios Estiramientos de la espalda 1. [address] y las rodillas en el piso. 2. Relaje la naveen y [address] [facility]. Arquee la espalda hacia el techo, hasta que sienta que las partes alessandro, media y baja se estiran agradablemente. Mantenga shital estiramiento tiffanie el tiempo que se sienta cómodo, o de 15 a 30 segundos. 3. Vuelva a la posición inicial con la espalda plana mientras está apoyado de devyn y rodillas. 4. Deje que noriega espalda se nivele empujando el [employer] [facility]. [address] [facility] (glúteos) hacia el techo. 5. Mantenga esta posición tiffanie 15 a 30 segundos. 6. Repita de 2 a 4 veces. La atención de seguimiento es santos parte clave de noriega tratamiento y seguridad. Asegúrese de hacer y acudir a todas las citas, y llame a noriega médico si está teniendo problemas. También es santos buena idea saber los resultados de los exámenes y mantener santos lista de los medicamentos que carmen. Dónde puede encontrar más información en inglés? Sheryl Blevins a [email or web address] Patricia Flowers O287 en la búsqueda para aprender más acerca de \"Ciática: Ejercicios - [ Sciatica: Exercises ]. \" 
Revisado: 21 marzo, 2017 Versión del contenido: 11.4 © 5621-9219 Healthwise, Expertcloud.de. Las instrucciones de cuidado fueron adaptadas bajo licencia por [employer] Help Connections (which disclaims liability or warranty for this information). Si usted tiene [place] afección médica o sobre estas instrucciones, siempre pregunte a noriega profesional de graham. Healthwise, Incorporated niega toda garantía o responsabilidad por noriega uso de esta información. Introducing hospitals & HEALTH SERVICES! [facility] introduce portal paciente MyChart . Ahora se puede acceder a partes de noriega expediente médico, enviar por correo electrónico la oficina de noriega médico y solicitar renovaciones de medicamentos en línea. En noriega navegador de Internet , Jessica Point a [email or web address] Dariusz clic en el usuario por [place]? Pari Peeling clic aquí en la sesión Artemio Eye. Verá la página de registro [place]. Ingrese noriega código de [employer] evaristo y tejal aparece a continuación. Usted no tendrá que [employer] código después de marie completado el proceso de registro . Si usted no se inscribe antes de la fecha de caducidad , debe solicitar un nuevo código. · MyChart Aitkin Blanks : [id number] Expires: 7/19/2018  9:32 AM 
 
Ingresa los últimos cuatro dígitos de noriega Número de Seguro Social ( xxxx ) y fecha de nacimiento ( dd / mm / aaaa ) tejal se indica y dariusz clic en Enviar. Usted será llevado a la siguiente página de registro . Crear un ID MyChart . Esta será noriega ID de inicio de sesión de MyChart y no puede ser [place] , por lo que pensar en santos que es Debroah Radha y fácil de recordar . Crear santos contraseña MyChart . Usted puede cambiar noriega contraseña en cualquier momento . Ingrese noriega Password Reset de preguntas y [place] . [place] se puede utilizar en un momento posterior si usted olvida noriega contraseña. Introduzca noriega dirección de correo electrónico . Moriah Arnold recibirá santos notificación por correo electrónico cuando la nueva información está disponible en MyChart . Nuno Heath clic en Registrarse. Olam Bustle megan y descargar porciones de noriega expediente médico. 
Dariusz clic en el enlace de descarga del menú Resumen para descargar santos copia portátil de noriega información médica . Si tiene Arabella [employer] , por favor visite la sección de preguntas frecuentes del sitio web MyChart . Recuerde, MyChart NO es que se utilizará para las necesidades urgentes. Para emergencias médicas , llame al 911 . Ahora disponible en noriega iPhone y Android ! Por favor proporcione shital resumen de la documentación de cuidado a noriega próximo proveedor. If you have any questions after today's visit, please call [phone].

## 2018-06-01 NOTE — PATIENT INSTRUCTIONS
Ciática: Instrucciones de cuidado - [ Sciatica: Care Instructions ]  Instrucciones de cuidado    La ciática es santos irritación de sailaja de los nervios ciáticos, que salen de la médula serra en la parte baja de la espalda. Los nervios ciáticos y radha ramificaciones se extienden hacia abajo por la nalga hasta el pie. La ciática puede desarrollarse cuando un disco lesionado en la espalda ejerce presión contra la raíz de un nervio serra. Mares síntoma principal es dolor, entumecimiento o debilidad que con frecuencia es peor en la pierna o el pie que en la espalda. A menudo la ciática mejora y desaparece con el paso del [place]. Un tratamiento temprano generalmente incluye medicamentos y ejercicios para aliviar el dolor. La atención de seguimiento es santos parte clave de mares tratamiento y seguridad. Asegúrese de hacer y acudir a todas las citas, y llame a mares médico si está teniendo problemas. También es santos buena idea saber los resultados de los exámenes y mantener santos lista de los medicamentos que carmen. ¿Cómo puede cuidarse en el hogar? · [employer] analgésicos (medicamentos para el dolor) exactamente tejal le fueron indicados. ¨ Si el médico le recetó un analgésico, tómelo según las indicaciones. ¨ Si no está tomando un analgésico recetado, pregúntele a mares médico si puede ema sailaja de [place]. · Utilice calor o frío para aliviar el dolor. ¨ Para aplicar calor, póngase santos bolsa de agua tibia, santos almohadilla térmica a baja temperatura o santos compresa tibia sobre la espalda. No se vaya a dormir con santos almohadilla térmica sobre la piel. ¨ Para utilizar frío, póngase hielo o santos compresa fría sobre la marianela tiffanie un período de 10 a 20 minutos cada vez. Póngase un paño levy entre el hielo y la piel. · Evite sentarse si es posible, a menos que se sienta mejor que de pie. · Alterne entre recostarse y narendra caminatas cortas. Aumente la distancia que camina tanto tejal pueda sin empeorar los síntomas.   · No dariusz nada que empeore los síntomas. ¿Cuándo debe pedir ayuda? Llame al 911 en cualquier momento que considere que necesita atención de [place]. Por ejemplo, llame si:  ? · Es completamente incapaz de  santos pierna. ?Llame a noriega médico ahora mismo o busque atención médica inmediata si:  ? · Tiene síntomas nuevos o peores en [address] (glúteos). Los síntomas pueden incluir:  ¨ Entumecimiento u hormigueo. ¨ Debilidad. ¨ Dolor. ? · Pierde el control de la vejiga o del intestino. ? Vigile de cerca los cambios en noriega graham y asegúrese de comunicarse con noriega médico si:  ? · No mejora tejal se esperaba. ¿Dónde puede encontrar más información en inglés? Garrett Late a [email or web address]-[email or web address].[email or web address] John Kimball S885 en la búsqueda para aprender más acerca de \"Ciática: Instrucciones de cuidado - [ Sciatica: Care Instructions ]. \"  Revisado: 21 marzo, 2017  Versión del contenido: 11.4  © 1774-0323 Healthwise, Incorporated. Las instrucciones de cuidado fueron adaptadas bajo licencia por Good Help Connections (which disclaims liability or warranty for this information). Si usted tiene [place] afección médica o sobre estas instrucciones, siempre pregunte a noriega profesional de graham. Healthwise, Incorporated niega toda garantía o responsabilidad por noriega uso de esta información. Ciática: Ejercicios - [ Sciatica: Exercises ]  Instrucciones de cuidado  Éstos son algunos ejemplos de ejercicios típicos de rehabilitación para noriega afección. Comience cada ejercicio lentamente. Reduzca la intensidad del ejercicio si Guillermo Rich a sentir dolor. Noriega médico o el fisioterapeuta le dirán cuándo puede comenzar con estos ejercicios y cuáles funcionarán mejor para usted. Cuando no esté activo, encuentre santos posición cómoda para descansar. Algunas personas se sienten cómodas en el piso o en santos cama de firmeza media con santos almohada pequeña debajo de la naveen y otra debajo de radha rodillas.  Otras personas prefieren acostarse de lado con santos almohada entre las rodillas. No permanezca en santos posición por [facility]. Dé paseos cortos (10 a 20 minutos) cada 2 a 3 horas. Evite las pendientes, las colinas y las escaleras hasta que se sienta mejor. Sólo camine distancias que pueda manejar sin dolor, especialmente dolor en las piernas. Cómo se hacen los ejercicios  Estiramientos de la espalda    1. [facility] y las rodillas en el piso. 2. Relaje la naveen y 200 [facility]. Arquee la espalda hacia el techo, hasta que sienta que las partes alessandro, media y baja se estiran agradablemente. Mantenga shital estiramiento tiffanie el tiempo que se sienta cómodo, o de 15 a 30 segundos. 3. Vuelva a la posición inicial con la espalda plana mientras está apoyado de devyn y rodillas. 4. Deje que noriega espalda se nivele empujando el [facility]. [facility] [facility] (glúteos) hacia el techo. 5. Mantenga esta posición tiffanie 15 a 30 segundos. 6. Repita de 2 a 4 veces. La atención de seguimiento es santos parte clave de noriega tratamiento y seguridad. Asegúrese de hacer y acudir a todas las citas, y llame a noriega médico si está teniendo problemas. También es santos buena idea saber los resultados de los exámenes y mantener santos lista de los medicamentos que carmen. ¿Dónde puede encontrar más información en inglés? Nathan Dodson a [email or web address]-[email or web address].info/. Kwame Chacko P325 en la búsqueda para aprender más acerca de \"Ciática: Ejercicios - [ Sciatica: Exercises ]. \"  Revisado: 21 marzo, 2017  Versión del contenido: 11.4  © 7119-1760 Healthwise, Incorporated. Las instrucciones de cuidado fueron adaptadas bajo licencia por Good Help Connections (which disclaims liability or warranty for this information). Si usted tiene [place] afección médica o sobre estas instrucciones, siempre pregunte a noriega profesional de graham. Healthwise, Incorporated niega toda garantía o responsabilidad por noriega uso de esta información.

## 2018-06-01 NOTE — PROGRESS NOTES
Assessment/Plan:    Diagnoses and all orders for this visit:    1. Sciatica of right side  -     predniSONE (DELTASONE) 10 mg tablet; Si today, then 1 less pill daily until all pills are gone. Wallisian sig please  -     ibuprofen (MOTRIN) 600 mg tablet; Take 1 Tab by mouth every six (6) hours as needed for Pain. [place] 1 adityailla cada 6 horas si necissita    2. Seasonal allergic rhinitis due to pollen  -     cetirizine-psuedoePHEDrine (ZYRTEC-D) 5-120 mg per tablet; Take 1 Tab by mouth two (2) times a day. -     fluticasone (FLONASE ALLERGY RELIEF) 50 mcg/actuation nasal spray; Si spray each nostril bid. uso 1 gota cada deshawn reich vecchana al jannie        Follow-up Disposition:  Return in about 6 weeks (around 2018), or if symptoms worsen or fail to improve. ANAM Abel expressed understanding of this plan. An AVS was printed and given to the patient.      ----------------------------------------------------------------------    Chief Complaint   Patient presents with    Nasal Congestion     Nasal Congestion X about 2 weeks    Leg Pain     Leg pain X about 2 weeks        History of Present Illness:    Pt presents for continued problem with severe nasal congestion. She is still taking loratadine and flonase as directed, they seemed to work for about 2 weeks then her sxs returned in spite of daily use. The cost of other nasal steroid inhalers was discussed with her and is cost prohibitive today. Her main sx is severe nasal congestion, she has gone through several packages of sudafed already. In the past week, she has developed left buttocks pain that radiates down her leg. No injury known of  To explain this. No numbness, bowel or bladder incontinence problems noted. No past medical history on file.     Current Outpatient Prescriptions   Medication Sig Dispense Refill    cetirizine-psuedoePHEDrine (ZYRTEC-D) 5-120 mg per tablet Take 1 Tab by mouth two (2) times a day. 60 Tab 2    predniSONE (DELTASONE) 10 mg tablet Si today, then 1 less pill daily until all pills are gone. Australian sig please 21 Tab 0    ibuprofen (MOTRIN) 600 mg tablet Take 1 Tab by mouth every six (6) hours as needed for Pain. Jette 1 pastilla cada 6 horas si necissita 60 Tab 1    fluticasone (FLONASE ALLERGY RELIEF) 50 mcg/actuation nasal spray Si spray each nostril bid. uso 1 gota cada nares dos veces al jannie 1 Bottle 5       No Known Allergies    Social History   Substance Use Topics    Smoking status: Never Smoker    Smokeless tobacco: Never Used    Alcohol use No       No family history on file.     Physical Exam:     Visit Vitals    /72 (BP 1 Location: Right arm, BP Patient Position: Sitting)    Pulse (!) 53    Temp 97.9 °F (36.6 °C) (Oral)    Ht 5' 3.75\" (1.619 m)    Wt 157 lb (71.2 kg)    LMP 2018    SpO2 100%    BMI 27.16 kg/m2     Pt sounds very congested  A&Ox3  WDWN NAD  Respirations normal and non labored  HEENT- raul TM with effusion, clear liquid  Nares- tubinates swollen to the point very limited air flow present  OP clear  Lungs CTA raul  Cor RRR s1s2  Pain SI joint left buttocks, neg SLR, neuro intact down to toes

## 2018-10-19 ENCOUNTER — OFFICE VISIT (OUTPATIENT)
Dept: FAMILY MEDICINE CLINIC | Age: 48
End: 2018-10-19

## 2018-10-19 VITALS
BODY MASS INDEX: 28.03 KG/M2 | DIASTOLIC BLOOD PRESSURE: 83 MMHG | HEART RATE: 73 BPM | SYSTOLIC BLOOD PRESSURE: 143 MMHG | WEIGHT: 162 LBS | TEMPERATURE: 97.5 F

## 2018-10-19 DIAGNOSIS — L30.9 DERMATITIS: Primary | ICD-10-CM

## 2018-10-19 DIAGNOSIS — Z23 ENCOUNTER FOR IMMUNIZATION: ICD-10-CM

## 2018-10-19 NOTE — PROGRESS NOTES
AVS printed, given and reviewed. Patient aware that provider will like for her to RTC tomorrow for fasting labs, and she was directed to registration to make appt. With Dr. Ngozi Beaver dermatologist. Flu Immunization given per provider order. Please see scanned consent form. No contraindications to vaccines. Documented in [address]. VIS given. Instructions for adverse reactions discussed. Explained that if symptoms (rash, , SOB, or body temp. Higher than 101.5'f ) to go to the nearest ER. Patient instructed to wait in the clinic for 15 minutes  to observe for any signs of immediate reaction. Told to tell a nurse immediately if reaction occur; if no change and felt well, it was OK to leave after 15 min. Patient expressed understanding. No adverse reaction noted at time of discharge from vaccine area. Belkis Ramírez RN

## 2018-10-19 NOTE — PROGRESS NOTES
Coordination of Care  1. Have you been to the ER, urgent care clinic since your last visit? Hospitalized since your last visit? No    2. Have you seen or consulted any other health care providers outside of the [address] [place] [address] since your last visit? Include any pap smears or colon screening. No    Does the patient need refills? YES    Learning Assessment Complete?  yes

## 2018-10-19 NOTE — PATIENT INSTRUCTIONS
Dermatitis: Instrucciones de cuidado - [ Dermatitis: Care Instructions ]  Instrucciones de cuidado  Dermatitis es el nombre general de cualquier salpullido o inflamación de la piel. Los distintos tipos de dermatitis causan diferentes tipos de salpullido. [address] causas comunes del salpullido se encuentran los medicamentos nuevos, las plantas (tejal el zumaque venenoso o la hiedra venenosa), el calor y el estrés. Ciertas enfermedades también pueden causar un salpullido. Santos reacción alérgica a algo que toca la piel, tejal el látex, el níquel o la hiedra venenosa, se llama dermatitis de contacto. La dermatitis de contacto también puede estar causada por algo que irrita la piel, tejal la [place], santos sustancia química o el jabón. Gabrielle tipo de salpullido no se puede transmitir de [facility] persona a otra. La duración del salpullido depende de noriega causa. El salpullido puede durar unos días o meses. La atención de seguimiento es santos parte clave de noriega tratamiento y seguridad. Asegúrese de hacer y acudir a todas las citas, y llame a noriega médico si está teniendo problemas. También es santos buena idea saber los resultados de radha exámenes y mantener santos lista de los medicamentos que carmen. ¿Cómo puede cuidarse en el hogar? · No se rasque el salpullido. Mantenga las uñas cortas y [place]. O use guantes si esto le ayuda a no rascarse. · Lávese la marianela solo con agua. Seque la marianela con toques suaves de toalla. · Colóquese paños húmedos y fríos sobre el salpullido para reducir la comezón. · Manténgase fresco y evite el sol. · Deje el salpullido en contacto con el aire tanto tejal sea posible. · Si el salpullido le da comezón, use crema de hidrocortisona. Siga las instrucciones de la etiqueta. La loción de calamina podría ayudar en el breanne del salpullido causado por plantas. · [place] un antihistamínico de venta [place], tejal difenhidramina (Benadryl) o loratadina (Claritin), para aliviar la comezón.  Lillie y siga todas las indicaciones de la etiqueta. · Si noriega médico le recetó santos crema, úsela según las indicaciones. Si noriega médico le recetó un medicamento, tómelo exactamente según las indicaciones. ¿Cuándo debe pedir ayuda? Llame a noriega médico ahora mismo o busque atención médica inmediata si:    · Tiene síntomas de infección, tales tejal:  ? Aumento del dolor, la hinchazón, la temperatura o el enrojecimiento. ? Vetas rojizas que salen de la marianela. ? Pus que sale de la marianela. ? [place].     · Tiene dolor en las articulaciones junto con el salpullido.    Preste especial atención a los cambios en noriega graham, y asegúrese de comunicarse con noriega médico si:    · El salpullido está cambiando o empeorando.     · No mejora tejal se esperaba. ¿Dónde puede encontrar más información en inglés? Charu Sang a [email or web address]-eugenia.[email or web address] Meseret Nuñez V402 en la búsqueda para aprender más acerca de \"Dermatitis: Instrucciones de cuidado - [ Dermatitis: Care Instructions ]. \"  Revisado: 18 sarah, 2018  Versión del contenido: 11.8  © 3684-3331 [employer]. Las instrucciones de cuidado fueron adaptadas bajo licencia por [employer] (which disclaims liability or warranty for this information). Si usted tiene Tunica Arverne afección médica o sobre estas instrucciones, siempre pregunte a noriega profesional de graham. [employer] niega toda garantía o responsabilidad por noriega uso de esta información.

## 2018-10-20 ENCOUNTER — LAB ONLY (OUTPATIENT)
Dept: FAMILY MEDICINE CLINIC | Age: 48
End: 2018-10-20

## 2018-10-20 ENCOUNTER — HOSPITAL ENCOUNTER (OUTPATIENT)
Dept: LAB | Age: 48
Discharge: HOME | End: 2018-10-20

## 2018-10-20 DIAGNOSIS — L30.9 DERMATITIS: ICD-10-CM

## 2018-10-20 LAB
ALBUMIN SERPL-MCNC: 4 G/DL (ref 3.5–5)
ALBUMIN/GLOB SERPL: 1.1 {RATIO} (ref 1.1–2.2)
ALP SERPL-CCNC: 125 U/L (ref 45–117)
ALT SERPL-CCNC: 21 U/L (ref 12–78)
ANION GAP SERPL CALC-SCNC: 8 MMOL/L (ref 5–15)
AST SERPL-CCNC: 13 U/L (ref 15–37)
BASOPHILS # BLD: 0 K/UL (ref 0–0.1)
BASOPHILS NFR BLD: 0 % (ref 0–1)
BILIRUB SERPL-MCNC: 0.4 MG/DL (ref 0.2–1)
BUN SERPL-MCNC: 15 MG/DL (ref 6–20)
BUN/CREAT SERPL: 23 (ref 12–20)
CALCIUM SERPL-MCNC: 8.9 MG/DL (ref 8.5–10.1)
CHLORIDE SERPL-SCNC: 103 MMOL/L (ref 97–108)
CHOLEST SERPL-MCNC: 216 MG/DL
CO2 SERPL-SCNC: 28 MMOL/L (ref 21–32)
CREAT SERPL-MCNC: 0.64 MG/DL (ref 0.55–1.02)
DIFFERENTIAL METHOD BLD: NORMAL
EOSINOPHIL # BLD: 0.2 K/UL (ref 0–0.4)
EOSINOPHIL NFR BLD: 3 % (ref 0–7)
ERYTHROCYTE [DISTWIDTH] IN BLOOD BY AUTOMATED COUNT: 12.4 % (ref 11.5–14.5)
GLOBULIN SER CALC-MCNC: 3.7 G/DL (ref 2–4)
GLUCOSE SERPL-MCNC: 81 MG/DL (ref 65–100)
HCT VFR BLD AUTO: 42.1 % (ref 35–47)
HDLC SERPL-MCNC: 72 MG/DL
HDLC SERPL: 3 {RATIO} (ref 0–5)
HGB BLD-MCNC: 13.4 G/DL (ref 11.5–16)
IMM GRANULOCYTES # BLD: 0 K/UL (ref 0–0.04)
IMM GRANULOCYTES NFR BLD AUTO: 0 % (ref 0–0.5)
LDLC SERPL CALC-MCNC: 126.6 MG/DL (ref 0–100)
LIPID PROFILE,FLP: ABNORMAL
LYMPHOCYTES # BLD: 1.5 K/UL (ref 0.8–3.5)
LYMPHOCYTES NFR BLD: 30 % (ref 12–49)
MCH RBC QN AUTO: 30.5 PG (ref 26–34)
MCHC RBC AUTO-ENTMCNC: 31.8 G/DL (ref 30–36.5)
MCV RBC AUTO: 95.7 FL (ref 80–99)
MONOCYTES # BLD: 0.5 K/UL (ref 0–1)
MONOCYTES NFR BLD: 9 % (ref 5–13)
NEUTS SEG # BLD: 2.8 K/UL (ref 1.8–8)
NEUTS SEG NFR BLD: 57 % (ref 32–75)
NRBC # BLD: 0 K/UL (ref 0–0.01)
NRBC BLD-RTO: 0 PER 100 WBC
PLATELET # BLD AUTO: 325 K/UL (ref 150–400)
PMV BLD AUTO: 10.9 FL (ref 8.9–12.9)
POTASSIUM SERPL-SCNC: 4.6 MMOL/L (ref 3.5–5.1)
PROT SERPL-MCNC: 7.7 G/DL (ref 6.4–8.2)
RBC # BLD AUTO: 4.4 M/UL (ref 3.8–5.2)
SODIUM SERPL-SCNC: 139 MMOL/L (ref 136–145)
TRIGL SERPL-MCNC: 87 MG/DL (ref ?–150)
VLDLC SERPL CALC-MCNC: 17.4 MG/DL
WBC # BLD AUTO: 4.9 K/UL (ref 3.6–11)

## 2018-10-20 PROCEDURE — 80061 LIPID PANEL: CPT | Performed by: PHYSICIAN ASSISTANT

## 2018-10-20 PROCEDURE — 83036 HEMOGLOBIN GLYCOSYLATED A1C: CPT | Performed by: PHYSICIAN ASSISTANT

## 2018-10-20 PROCEDURE — 85025 COMPLETE CBC W/AUTO DIFF WBC: CPT | Performed by: PHYSICIAN ASSISTANT

## 2018-10-20 PROCEDURE — 80053 COMPREHEN METABOLIC PANEL: CPT | Performed by: PHYSICIAN ASSISTANT

## 2018-10-21 LAB
EST. AVERAGE GLUCOSE BLD GHB EST-MCNC: 105 MG/DL
HBA1C MFR BLD: 5.3 % (ref 4.2–6.3)

## 2018-11-01 ENCOUNTER — TELEPHONE (OUTPATIENT)
Dept: FAMILY MEDICINE CLINIC | Age: 48
End: 2018-11-01

## 2018-11-01 NOTE — TELEPHONE ENCOUNTER
Tanzanian-speaking patient, Janett Oleg, Int. [id number]) called for an appointment with dermatologist.  There is a tickler in place but no available appointments. I believe he will not be on our schedule until April or May. I just told patient that I would have someone call her.     Johnie Edwards

## 2018-11-12 DIAGNOSIS — R21 RASH AND NONSPECIFIC SKIN ERUPTION: Primary | ICD-10-CM

## 2018-11-12 NOTE — PROGRESS NOTES
Pt had a referral to Dr Billie Alfaro, but he is not available for 6 months, so I have put in another referral to access now derm  Thank you for starting the process

## 2018-11-19 NOTE — TELEPHONE ENCOUNTER
Routed to registrar to schedule with Duy PETERS for AN for Derm. Isabela scheduled the pt an appt for AN screening with Duy Rhoades.  Yolis Agee RN

## 2018-12-07 ENCOUNTER — OFFICE VISIT (OUTPATIENT)
Dept: FAMILY MEDICINE CLINIC | Age: 48
End: 2018-12-07

## 2018-12-07 DIAGNOSIS — Z71.89 COUNSELING AND COORDINATION OF CARE: Primary | ICD-10-CM

## 2018-12-07 NOTE — PROGRESS NOTES
Met with patient for [facility] Referral. Support letter pending for completion of financial screening.

## 2019-11-20 ENCOUNTER — CLINICAL SUPPORT (OUTPATIENT)
Dept: FAMILY MEDICINE CLINIC | Age: 49
End: 2019-11-20

## 2019-11-20 DIAGNOSIS — Z23 ENCOUNTER FOR IMMUNIZATION: Primary | ICD-10-CM

## 2019-11-20 NOTE — PROGRESS NOTES
Requests flu vaccine; denies fever, egg allergy. Immunization given per protocol [facility] VIS information sheet given, explained possible S/E. Reviewed sx indicating need to be seen in ER. Pt had no adverse reaction at time of discharge. Given by MORELIA PINEDAEAT TIERRA Str[place].